# Patient Record
Sex: FEMALE | Race: WHITE | NOT HISPANIC OR LATINO | Employment: FULL TIME | URBAN - METROPOLITAN AREA
[De-identification: names, ages, dates, MRNs, and addresses within clinical notes are randomized per-mention and may not be internally consistent; named-entity substitution may affect disease eponyms.]

---

## 2023-10-23 ENCOUNTER — OFFICE VISIT (OUTPATIENT)
Dept: MEDICAL GROUP | Facility: MEDICAL CENTER | Age: 43
End: 2023-10-23
Payer: COMMERCIAL

## 2023-10-23 VITALS
TEMPERATURE: 98.2 F | HEART RATE: 60 BPM | SYSTOLIC BLOOD PRESSURE: 108 MMHG | HEIGHT: 70 IN | WEIGHT: 156.2 LBS | BODY MASS INDEX: 22.36 KG/M2 | DIASTOLIC BLOOD PRESSURE: 70 MMHG | OXYGEN SATURATION: 99 %

## 2023-10-23 DIAGNOSIS — F41.9 ANXIETY: ICD-10-CM

## 2023-10-23 DIAGNOSIS — F33.2 SEVERE EPISODE OF RECURRENT MAJOR DEPRESSIVE DISORDER, WITHOUT PSYCHOTIC FEATURES (HCC): ICD-10-CM

## 2023-10-23 DIAGNOSIS — D72.819 LEUKOPENIA, UNSPECIFIED TYPE: ICD-10-CM

## 2023-10-23 DIAGNOSIS — R76.8 ANA POSITIVE: ICD-10-CM

## 2023-10-23 DIAGNOSIS — R74.01 TRANSAMINITIS: ICD-10-CM

## 2023-10-23 PROBLEM — F33.9 RECURRENT MAJOR DEPRESSIVE DISORDER (HCC): Status: ACTIVE | Noted: 2023-10-23

## 2023-10-23 PROCEDURE — 3074F SYST BP LT 130 MM HG: CPT | Performed by: STUDENT IN AN ORGANIZED HEALTH CARE EDUCATION/TRAINING PROGRAM

## 2023-10-23 PROCEDURE — 99204 OFFICE O/P NEW MOD 45 MIN: CPT | Performed by: STUDENT IN AN ORGANIZED HEALTH CARE EDUCATION/TRAINING PROGRAM

## 2023-10-23 PROCEDURE — 3078F DIAST BP <80 MM HG: CPT | Performed by: STUDENT IN AN ORGANIZED HEALTH CARE EDUCATION/TRAINING PROGRAM

## 2023-10-23 RX ORDER — LORAZEPAM 1 MG/1
TABLET ORAL
COMMUNITY
Start: 2023-09-05

## 2023-10-23 RX ORDER — SERTRALINE HYDROCHLORIDE 100 MG/1
100 TABLET, FILM COATED ORAL DAILY
Qty: 90 TABLET | Refills: 3 | Status: SHIPPED | OUTPATIENT
Start: 2023-10-23 | End: 2024-01-29

## 2023-10-23 RX ORDER — HYDROXYZINE HYDROCHLORIDE 25 MG/1
TABLET, FILM COATED ORAL
COMMUNITY
Start: 2023-10-02 | End: 2024-03-12 | Stop reason: SDUPTHER

## 2023-10-23 ASSESSMENT — ENCOUNTER SYMPTOMS
DIZZINESS: 0
HEADACHES: 0
VOMITING: 0
WEIGHT LOSS: 0
PALPITATIONS: 0
CHILLS: 0
WHEEZING: 0
FEVER: 0
NAUSEA: 0
SHORTNESS OF BREATH: 0

## 2023-10-23 ASSESSMENT — PATIENT HEALTH QUESTIONNAIRE - PHQ9
CLINICAL INTERPRETATION OF PHQ2 SCORE: 5
SUM OF ALL RESPONSES TO PHQ QUESTIONS 1-9: 20
5. POOR APPETITE OR OVEREATING: 3 - NEARLY EVERY DAY

## 2023-10-23 ASSESSMENT — ANXIETY QUESTIONNAIRES
7. FEELING AFRAID AS IF SOMETHING AWFUL MIGHT HAPPEN: MORE THAN HALF THE DAYS
4. TROUBLE RELAXING: NEARLY EVERY DAY
2. NOT BEING ABLE TO STOP OR CONTROL WORRYING: NEARLY EVERY DAY
1. FEELING NERVOUS, ANXIOUS, OR ON EDGE: NEARLY EVERY DAY
GAD7 TOTAL SCORE: 19
5. BEING SO RESTLESS THAT IT IS HARD TO SIT STILL: MORE THAN HALF THE DAYS
6. BECOMING EASILY ANNOYED OR IRRITABLE: NEARLY EVERY DAY
3. WORRYING TOO MUCH ABOUT DIFFERENT THINGS: NEARLY EVERY DAY
IF YOU CHECKED OFF ANY PROBLEMS ON THIS QUESTIONNAIRE, HOW DIFFICULT HAVE THESE PROBLEMS MADE IT FOR YOU TO DO YOUR WORK, TAKE CARE OF THINGS AT HOME, OR GET ALONG WITH OTHER PEOPLE: SOMEWHAT DIFFICULT

## 2023-10-23 NOTE — LETTER
[facility]  Joseph Cuba M.D.  [address] [facility] [address]  [place] [address]  Fax: [phone]   Authorization for Release/Disclosure of   Protected Health Information   Name: DEION SANTOS : 1980 SSN: [id number]   Address: [address] [place] [address] Dr Varma  [place] Phone:    [phone] (home)    I authorize the entity listed below to release/disclose the PHI below to:   [facility]/Joseph Cuba M.D. and Joseph Cuba M.D.   Provider or Entity Name:  [place] [facility] - Radha Cortes MD    Address   City, [facility], [facility]   Phone:      Fax:     Reason for request: continuity of care   Information to be released:    [  ] LAST COLONOSCOPY,  including any PATH REPORT and follow-up  [  ] LAST FIT/COLOGUARD RESULT [  ] LAST DEXA  [  ] LAST MAMMOGRAM  [  ] LAST PAP  [  ] LAST LABS [  ] RETINA EXAM REPORT  [  ] IMMUNIZATION RECORDS  [  ] Release all info  Xxx last progress note      [  ] Check here and initial the line next to each item to release ALL health information INCLUDING  _____ Care and treatment for drug and / or alcohol abuse  _____ HIV testing, infection status, or AIDS  _____ Genetic Testing    DATES OF SERVICE OR TIME PERIOD TO BE DISCLOSED: _____________  I understand and acknowledge that:  * This Authorization may be revoked at any time by you in writing, except if your health information has already been used or disclosed.  * Your health information that will be used or disclosed as a result of you signing this authorization could be re-disclosed by the recipient. If this occurs, your re-disclosed health information may no longer be protected by State or Federal laws.  * You may refuse to sign this Authorization. Your refusal will not affect your ability to obtain treatment.  * This Authorization becomes effective upon signing and will  on (date) __________.      If no date is indicated, this Authorization will  one (1) year from the signature date.    Name:  Obdulia Dawson  Signature: Date:   10/23/2023     PLEASE FAX REQUESTED RECORDS BACK TO: [phone]

## 2023-10-23 NOTE — LETTER
[facility]  Joseph Cuba M.D.  [address] [facility] [address]  [place] [address]  Fax: [phone]   Authorization for Release/Disclosure of   Protected Health Information   Name: DEION SANTOS : 1980 SSN: [id number]   Address: [address] [place] [address]  [facility] [address] Phone:    [phone] (home)    I authorize the entity listed below to release/disclose the PHI below to:   [facility]/Joseph Cuba M.D. and Joseph Cuba M.D.   Provider or Entity Name:  Aliya Brigitte  [facility] [address] [facility] [address]     Address   City, State, [facility]   Phone:      Fax:     Reason for request: continuity of care   Information to be released:    [  ] LAST COLONOSCOPY,  including any PATH REPORT and follow-up  [  ] LAST FIT/COLOGUARD RESULT [  ] LAST DEXA  [  ] LAST MAMMOGRAM  [x  ] LAST PAP  [  ] LAST LABS [  ] RETINA EXAM REPORT  [ x ] IMMUNIZATION RECORDS  [  ] Release all info  X last progress note      [  ] Check here and initial the line next to each item to release ALL health information INCLUDING  _____ Care and treatment for drug and / or alcohol abuse  _____ HIV testing, infection status, or AIDS  _____ Genetic Testing    DATES OF SERVICE OR TIME PERIOD TO BE DISCLOSED: _____________  I understand and acknowledge that:  * This Authorization may be revoked at any time by you in writing, except if your health information has already been used or disclosed.  * Your health information that will be used or disclosed as a result of you signing this authorization could be re-disclosed by the recipient. If this occurs, your re-disclosed health information may no longer be protected by State or Federal laws.  * You may refuse to sign this Authorization. Your refusal will not affect your ability to obtain treatment.  * This Authorization becomes effective upon signing and will  on (date) __________.      If no date is indicated, this Authorization will  one (1) year from  the signature date.    Name: Obdulia Dawson  Signature: Date:   10/23/2023     PLEASE FAX REQUESTED RECORDS BACK TO: [phone]

## 2023-10-23 NOTE — PROGRESS NOTES
Subjective:     CC:  Diagnoses of Anxiety, Mild episode of recurrent major depressive disorder (HCC), DANYA positive, Leukopenia, unspecified type, and Transaminitis were pertinent to this visit.    HISTORY OF THE PRESENT ILLNESS: Patient is a 43 y.o. female. This pleasant patient is here today to establish care and discuss     OSH  lab reviewed as per HPI          Problem   Anxiety    History of anxiety depression previously well controlled on Lexapro unfortunately patient self titrated off Lexapro.  She was restarted on Lexapro in [place] however medication appears to be ineffective.  She reports she underwent some genetic testing and was started on sertraline 50 mg daily around 8/15/2023 based on results.  Currently she remains depressed and anxious.  Will increase sertraline to 100 mg.  Patient report has follow-up with psychiatry in 1 month     Recurrent Major Depressive Disorder (Hcc)   Danya Positive   Leukopenia    History of mild leukopenia with low ANC intermittent from [place] 2022.  Was referred to rheumatology and hematology.  -Work-up by primary in [place] noted positive DANYA patient was referred to rheumatology underwent work-up for urinalysis without proteinuria or hematuria, DANYA titer 1-40 double-stranded DNA antibody less than 1, negative RF, negative CCP negative Sjogren SCL 70 Norma 1 negative centromere antibody negative ribosomal P antibody negative smooth Mastel antibody screening negative mitochondria antibody screening    Further cancer care specialist Radha  Lab work-up included CBC with differential 8/4/2023 normal WBC count normal ANC, flow cytometry no monoclonal B antibody aberrant T cells or increase in blasts no evidence of non-Hodgkin lymphoproliferative disorder or acute leukemia ESR was normal normal renal function       Transaminitis    Mild   ribosomal P antibody negative smooth Mastel antibody screening negative mitochondria antibody screening         Health Maintenance:     ROS:  "  Review of Systems   Constitutional:  Negative for chills, fever and weight loss.   HENT:  Negative for hearing loss.    Respiratory:  Negative for shortness of breath and wheezing.    Cardiovascular:  Negative for chest pain and palpitations.   Gastrointestinal:  Negative for nausea and vomiting.   Genitourinary:  Negative for frequency and urgency.   Skin:  Negative for rash.   Neurological:  Negative for dizziness and headaches.         Objective:       Exam: /70 (BP Location: Left arm, Patient Position: Sitting)   Pulse 60   Temp 36.8 °C (98.2 °F) (Temporal)   Ht 1.778 m (5' 10\")   Wt 70.9 kg (156 lb 3.2 oz)   SpO2 99%  Body mass index is 22.41 kg/m².    Physical Exam  Constitutional:       Appearance: Normal appearance.   Cardiovascular:      Rate and Rhythm: Normal rate and regular rhythm.   Pulmonary:      Effort: Pulmonary effort is normal.      Breath sounds: Normal breath sounds.   Musculoskeletal:      Cervical back: Normal range of motion and neck supple.   Lymphadenopathy:      Cervical: No cervical adenopathy.   Neurological:      Mental Status: She is alert.           Labs:     Assessment & Plan:   43 y.o. female with the following -    1. Anxiety  2. Severe episode of recurrent major depressive disorder, without psychotic features (HCC)  Chronic, stable  Anxiety depression poorly controlled however denies SI HI  Has follow-up with psychiatry in 1 month  Report currently taking sertraline 50 mg daily with out significant adverse effect.  Will increase sertraline to 100 mg further management per psychiatry patient understands she can stop medication if she experience any acute change in her mood  - sertraline (ZOLOFT) 50 MG Tab; Take 2 Tablets by mouth every day.  Dispense: 90 Tablet; Refill: 3      3. ELIECER positive  Mild 1-40  Denies dry eyes dry mouth joint pain  Noted in previous lab  Incidental and work-up for leukopenia  Lab reviewed as per HPI  - Comp Metabolic Panel; Future    4. " Leukopenia, unspecified type  Chronic, stable, intermittent  We will request records from hematology and rheumatology  Labs reviewed as per HPI  Most recent lab 8/2023 shows normal WBC count and differentials  Plan  Requested records  Repeat labs  May consider referral if needed  - CBC WITH DIFFERENTIAL; Future  - Comp Metabolic Panel; Future    5. Transaminitis  Chronic, stable  Mild ALT elevation had autoimmune lab work done by rheumatology that was negative as per HPI  - Comp Metabolic Panel; Future      Return in about 3 months (around 1/23/2024).    Please note that this dictation was created using voice recognition software. I have made every reasonable attempt to correct obvious errors, but I expect that there are errors of grammar and possibly content that I did not discover before finalizing the note.

## 2023-10-23 NOTE — LETTER
[facility]  Joseph Cuba M.D.  [address] [facility] [[email or web address]] [address]  [place] [address]  Fax: [phone]   Authorization for Release/Disclosure of   Protected Health Information   Name: OBDULIA SANTOS : 1980 SSN: [id number]   Address: [address] [place] [address] [address]ma  [place] [address] Phone:    [phone] (home)    I authorize the entity listed below to release/disclose the PHI below to:   [facility]/Joseph Cuba M.D. and Joseph Cuba M.D.   Provider or Entity Name:  [place] [facility]   kellen Layton  [address] [place], [place]    Address   City, State, [facility]   Phone:      Fax:     Reason for request: continuity of care   Information to be released:    [  ] LAST COLONOSCOPY,  including any PATH REPORT and follow-up  [  ] LAST FIT/COLOGUARD RESULT [  ] LAST DEXA  [  ] LAST MAMMOGRAM  [  ] LAST PAP  [  ] LAST LABS [  ] RETINA EXAM REPORT  [  ] IMMUNIZATION RECORDS  [  ] Release all info      [  ] Check here and initial the line next to each item to release ALL health information INCLUDING  _____ Care and treatment for drug and / or alcohol abuse  _____ HIV testing, infection status, or AIDS  _____ Genetic Testing    DATES OF SERVICE OR TIME PERIOD TO BE DISCLOSED: _____________  I understand and acknowledge that:  * This Authorization may be revoked at any time by you in writing, except if your health information has already been used or disclosed.  * Your health information that will be used or disclosed as a result of you signing this authorization could be re-disclosed by the recipient. If this occurs, your re-disclosed health information may no longer be protected by State or Federal laws.  * You may refuse to sign this Authorization. Your refusal will not affect your ability to obtain treatment.  * This Authorization becomes effective upon signing and will  on (date) __________.      If no date is indicated, this Authorization will  one (1) year from the signature date.    Name: Obdulia  Samir  Signature: Date:   10/23/2023     PLEASE FAX REQUESTED RECORDS BACK TO: [phone]

## 2023-11-20 ENCOUNTER — TELEMEDICINE (OUTPATIENT)
Dept: BEHAVIORAL HEALTH | Facility: CLINIC | Age: 43
End: 2023-11-20
Payer: COMMERCIAL

## 2023-11-20 ENCOUNTER — PATIENT MESSAGE (OUTPATIENT)
Dept: BEHAVIORAL HEALTH | Facility: MEDICAL CENTER | Age: 43
End: 2023-11-20
Payer: COMMERCIAL

## 2023-11-20 DIAGNOSIS — F41.1 GAD (GENERALIZED ANXIETY DISORDER): ICD-10-CM

## 2023-11-20 DIAGNOSIS — F33.2 MDD (MAJOR DEPRESSIVE DISORDER), RECURRENT SEVERE, WITHOUT PSYCHOSIS (HCC): ICD-10-CM

## 2023-11-20 PROCEDURE — 90833 PSYTX W PT W E/M 30 MIN: CPT | Mod: GT | Performed by: PSYCHIATRY & NEUROLOGY

## 2023-11-20 PROCEDURE — 99204 OFFICE O/P NEW MOD 45 MIN: CPT | Mod: GT | Performed by: PSYCHIATRY & NEUROLOGY

## 2023-11-20 RX ORDER — DESVENLAFAXINE SUCCINATE 50 MG/1
50 TABLET, EXTENDED RELEASE ORAL EVERY EVENING
Qty: 90 TABLET | Refills: 1 | Status: SHIPPED | OUTPATIENT
Start: 2023-11-20 | End: 2023-12-11

## 2023-11-20 ASSESSMENT — ANXIETY QUESTIONNAIRES
5. BEING SO RESTLESS THAT IT IS HARD TO SIT STILL: SEVERAL DAYS
6. BECOMING EASILY ANNOYED OR IRRITABLE: NEARLY EVERY DAY
4. TROUBLE RELAXING: NEARLY EVERY DAY
7. FEELING AFRAID AS IF SOMETHING AWFUL MIGHT HAPPEN: SEVERAL DAYS
2. NOT BEING ABLE TO STOP OR CONTROL WORRYING: NEARLY EVERY DAY
IF YOU CHECKED OFF ANY PROBLEMS ON THIS QUESTIONNAIRE, HOW DIFFICULT HAVE THESE PROBLEMS MADE IT FOR YOU TO DO YOUR WORK, TAKE CARE OF THINGS AT HOME, OR GET ALONG WITH OTHER PEOPLE: SOMEWHAT DIFFICULT
1. FEELING NERVOUS, ANXIOUS, OR ON EDGE: NEARLY EVERY DAY
GAD7 TOTAL SCORE: 17
3. WORRYING TOO MUCH ABOUT DIFFERENT THINGS: NEARLY EVERY DAY

## 2023-11-20 ASSESSMENT — PATIENT HEALTH QUESTIONNAIRE - PHQ9
CLINICAL INTERPRETATION OF PHQ2 SCORE: 6
SUM OF ALL RESPONSES TO PHQ QUESTIONS 1-9: 18
5. POOR APPETITE OR OVEREATING: 1 - SEVERAL DAYS

## 2023-11-20 NOTE — PROGRESS NOTES
"[facility]  INITIAL PSYCHIATRY EVALUATION    This evaluation was conducted via Zoom, using secure and encrypted videoconferencing technology. The patient was physically located at their home address in [place], [place], and the physician was located at her home office in [place], [place]. The patient was presented by self. The patient’s identity was confirmed and verbal consent for the telemedicine encounter was obtained.      CC:  Initial Evaluation and Medication Management of Mental Health Symptoms      History Of Present Illness:  Obdulia Dawson is a 43 y.o. 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     The patient reported the following:  She has been struggling with depression and anxiety her most of her life, strong family hx of both, has been on SSRIs since 2006, most recently Lexapro 20 mg and discontinued it in April to see what her baseline was, and did okay for a little while and then started feeling depressed and anxious and noticing OCD thoughts behaviors.  With her depression and anxiety, it has always been present even with medications but now it is too intense and she feels like her feet are in concrete and she is always having to say to herself \"take it 1 step at a time\" and \"you are fine.\"  She needed Ativan for her anxiety and panic attacks until she began responding to the Zoloft, increased to 100 mg about 3 weeks ago and she is not noticing any significant improvement.  She takes Hydroxyzine 50 mg at times for her insomnia, doesn't want to have to rely on something and so tries to not take it daily. 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  Her  is retired from law enforcement.  She prefers being busy all day.  She has very good sleep hygiene and uses the calm ozzy and works to eat healthy and also exercise.    ROS: As noted above in HPI.        Past Psychiatric History:  Denies any hospitalizations  Denies any history of SI, SA or self harm  Medication trials:  all SSRIs, has [employer] testing      Family Psychiatric History:  Depression and Anxiety    Substance Use/Addiction History:  Alcohol:  hx of alcohol use DO, in remission x 4 years with participation in peer support/recovery  Cannabis:  denies  Tobacco:  used to smoke tobacco and then switched to vaping and now uses 3 mg nicotine pods  Caffeine:  makes coffee in AM 1/2 decaf and often has a Coca Cola or coffee in PM but doesn't notice it impacting her sleep, sleeps well once she falls asleep  Other:  Denies any other substances    Social History:  From [place], , one daughter 22 years old, estranged relationship and they have reconnected, her daughter has been living with her ex- since she was 15 years old.  Denies any hx of abuse/trauma.    Allergies:  Patient has no known allergies.      Physical Examination and Mental Status Exam:  Vital signs: There were no vitals taken for this visit.    CONSTITUTIONAL:  General Appearance:  Clean, casual attire, good eye contact, engaged with provider    ORIENTATION:  Oriented to time, place and person  RECENT AND REMOTE MEMORY:  Grossly intact  ATTENTION SPAN AND CONCENTRATION:  within normal range  LANGUAGE:  no deficits appreciated  FUND OF KNOWLEDGE:  has awareness of current events, past history and normal vocabulary  SPEECH:  normal volume, amount, rate and articulation, no perseveration or paucity of language  MOOD:  Depressed and anxious    AFFECT:  Congruent with mood  THOUGHT PROCESS:  logical and goal directed  THOUGHT CONTENT:  Denies any SI/HI or AVH, no delusional thinking nor preoccupations appreciated  ASSOCIATIONS:  Intact, not loose, no tangentiality or circumstantiality  MEMORY:  No gross evidence of memory deficits  JUDGMENT:  adequate concerning everyday activities  INSIGHT:  adequate to psychiatric condition    DIAGNOSTIC IMPRESSION:  1. MDD (major depressive disorder), recurrent severe, without psychosis ([facility])  - desvenlafaxine succinate (PRISTIQ) 50 MG TABLET SR 24 HR; Take 1 Tablet by mouth every evening.  Dispense: 90 Tablet; Refill: 1  - Patient has been identified as having a positive depression screening. Appropriate orders and counseling have been given.    2. NICKIE (generalized anxiety disorder)  - desvenlafaxine succinate (PRISTIQ) 50 MG TABLET SR 24 HR; Take 1 Tablet by mouth every evening.  Dispense: 90 Tablet; Refill: 1       Assessment and Plan:  The patient's risk of suicide is assessed as low.  1.  MDD, recurrent, severe, without psychotic features, improving  NICKIE, improving  Insomnia, problems falling asleep, not well controlled  Nicotine dependence, former smoker, now using nicotine pods 3 mg  ETOH use DO, in remission  Continue Zoloft 100 mg for now, slow metabolizer per Genesight  Begin Pristiq 50 mg (normal metabolizer per [employer]) and if do well, then okay to reduce Zoloft to 50 mg before next appointment  Continue Hydroxyzine 25 mg 1 to 2 QHS PRN insomnia  Begin daily intentional breathing practice - mychart msg sent  Continue good sleep hygiene - uses CALM and other behavioral strategies to prepare for bed  Continue in peer recovery for continued remission from alcohol  Educ about caffeine's impact on anxiety and insomnia  Reviewed her moka5 testing which was sent via email 11/20/23 - will plan to save copy to her medical record, by Sansan - Several genetic variations that impact her response to  medications  Reviewed her medical record prior to the start of her appt,     2.  The patient has a safety plan which included the 988 crisis text and phone line and going to the nearest ED if symptoms worsen.    3.  Risks, benefits, alternatives and side effects were discussed for all medicines prescribed at this visit.  The patient voiced understanding providing informed consent.  The patient agrees to call the clinic with any questions or concerns, or seek emergent medical care if warranted.    4.  Follow up in 4 weeks or call sooner PRN    The proposed treatment plan was discussed with the patient who was provided the opportunity to ask questions and make suggestions regarding alternative treatment. Patient verbalized understanding and expressed agreement with the plan.     Greater than 16 minutes of the visit was spent in psychotherapy.  Psychotherapy include:  Provided the patient with supportive psychotherapy to build rapport and establish a therapeutic alliance with the patient, psychoeducation, topics: history of her struggle with depression and anxiety, healthy coping strategies, peer recovery, genesight testing results and meaning of the results, breathing exercises for anxiety.    Delia Armstrong M.D.      This note was created using voice recognition software (Dragon). The accuracy of the dictation is limited by the abilities of the software. I have reviewed the note prior to signing, however some errors in grammar and context are still possible. If you have any questions related to this note please do not hesitate to contact our office.

## 2023-12-11 RX ORDER — DESVENLAFAXINE 25 MG/1
25 TABLET, EXTENDED RELEASE ORAL
Qty: 90 TABLET | Refills: 1 | Status: SHIPPED | OUTPATIENT
Start: 2023-12-11 | End: 2024-01-29

## 2023-12-17 ENCOUNTER — OFFICE VISIT (OUTPATIENT)
Dept: URGENT CARE | Facility: PHYSICIAN GROUP | Age: 43
End: 2023-12-17
Payer: COMMERCIAL

## 2023-12-17 ENCOUNTER — APPOINTMENT (OUTPATIENT)
Dept: URGENT CARE | Facility: PHYSICIAN GROUP | Age: 43
End: 2023-12-17
Payer: COMMERCIAL

## 2023-12-17 VITALS
HEART RATE: 91 BPM | SYSTOLIC BLOOD PRESSURE: 119 MMHG | BODY MASS INDEX: 22.66 KG/M2 | WEIGHT: 153 LBS | HEIGHT: 69 IN | TEMPERATURE: 98 F | OXYGEN SATURATION: 100 % | RESPIRATION RATE: 20 BRPM | DIASTOLIC BLOOD PRESSURE: 79 MMHG

## 2023-12-17 DIAGNOSIS — B33.8 RSV INFECTION: ICD-10-CM

## 2023-12-17 DIAGNOSIS — J02.9 PHARYNGITIS, UNSPECIFIED ETIOLOGY: ICD-10-CM

## 2023-12-17 LAB
FLUAV RNA SPEC QL NAA+PROBE: NEGATIVE
FLUBV RNA SPEC QL NAA+PROBE: NEGATIVE
RSV RNA SPEC QL NAA+PROBE: POSITIVE
S PYO DNA SPEC NAA+PROBE: NOT DETECTED
SARS-COV-2 RNA RESP QL NAA+PROBE: NEGATIVE

## 2023-12-17 PROCEDURE — 0241U POCT CEPHEID COV-2, FLU A/B, RSV - PCR: CPT | Performed by: PHYSICIAN ASSISTANT

## 2023-12-17 PROCEDURE — 99213 OFFICE O/P EST LOW 20 MIN: CPT | Performed by: PHYSICIAN ASSISTANT

## 2023-12-17 PROCEDURE — 3078F DIAST BP <80 MM HG: CPT | Performed by: PHYSICIAN ASSISTANT

## 2023-12-17 PROCEDURE — 87651 STREP A DNA AMP PROBE: CPT | Performed by: PHYSICIAN ASSISTANT

## 2023-12-17 PROCEDURE — 3074F SYST BP LT 130 MM HG: CPT | Performed by: PHYSICIAN ASSISTANT

## 2023-12-17 NOTE — PROGRESS NOTES
"Subjective:   Obdulia Dawson is a 43 y.o. female who presents for Pharyngitis (2 days )      HPI  The patient presents to the Urgent Care with complaints of a sore throat onset 2 days ago.  Hurts to swallow but handling oral secretions and tolerating fluids well.  She also reports of ear pain.  Associated chest congestion and she started coughing a little this morning with phlegm.  She is here to rule out strep.  Denies any known fever, chest pain, SOB, wheezing, vomiting, diarrhea. Positive sick contacts.       History reviewed. No pertinent past medical history.  No Known Allergies     Objective:     /79   Pulse 91   Temp 36.7 °C (98 °F) (Temporal)   Resp 20   Ht 1.753 m (5' 9\")   Wt 69.4 kg (153 lb)   SpO2 100%   BMI 22.59 kg/m²     Physical Exam  Vitals reviewed.   Constitutional:       General: She is not in acute distress.     Appearance: Normal appearance. She is not ill-appearing or toxic-appearing.   HENT:      Right Ear: Tympanic membrane normal.      Left Ear: Tympanic membrane normal.      Mouth/Throat:      Mouth: Mucous membranes are moist.      Pharynx: Uvula midline. Posterior oropharyngeal erythema present. No pharyngeal swelling, oropharyngeal exudate or uvula swelling.      Tonsils: No tonsillar exudate or tonsillar abscesses.   Eyes:      Conjunctiva/sclera: Conjunctivae normal.   Cardiovascular:      Rate and Rhythm: Normal rate and regular rhythm.      Heart sounds: Normal heart sounds.   Pulmonary:      Effort: Pulmonary effort is normal. No respiratory distress.      Breath sounds: Normal breath sounds. No wheezing, rhonchi or rales.   Musculoskeletal:      Cervical back: Neck supple. No rigidity.   Lymphadenopathy:      Cervical: No cervical adenopathy.   Skin:     General: Skin is warm and dry.   Neurological:      General: No focal deficit present.      Mental Status: She is alert and oriented to person, place, and time.   Psychiatric:         Mood and Affect: Mood normal.         " Behavior: Behavior normal.       Results for orders placed or performed in visit on 12/17/23   POCT CEPHEID GROUP A STREP - PCR   Result Value Ref Range    POC Group A Strep, PCR Not Detected Not Detected, Invalid   POCT CoV-2, Flu A/B, RSV by PCR   Result Value Ref Range    SARS-CoV-2 by PCR Negative Negative, Invalid    Influenza virus A RNA Negative Negative, Invalid    Influenza virus B, PCR Negative Negative, Invalid    RSV, PCR Positive (A) Negative, Invalid     Diagnosis and associated orders:     1. RSV infection  - POCT CoV-2, Flu A/B, RSV by PCR    2. Pharyngitis, unspecified etiology  - POCT CEPHEID GROUP A STREP - PCR       Comments/MDM:     Viral etiology. They have a normal pulse oximetry on room air, afebrile, and a normal pulmonary exam. Overall, the patient is very well appearing. I do not feel that this patient would benefit from antibiotics at this time.   Recommended symptomatic and supportive care at this time that includes plenty of fluids, rest, Tylenol/Ibuprofen for pain/fever, warm salt water gargles for sore throat, OTC cough and decongestant medication, Flonase, nasal saline washes. If no improvement in 5-7 days or any worsening symptoms, recommend returning to the urgent care for re-evaluation.        I personally reviewed prior external notes and test results pertinent to today's visit. Pathogenesis of diagnosis discussed including typical length and natural progression. Supportive care, natural history, differential diagnoses, and indications for immediate follow-up discussed. Patient expresses understanding and agrees to plan. Patient denies any other questions or concerns.     Follow-up with the primary care physician for recheck, reevaluation, and consideration of further management.    Please note that this dictation was created using voice recognition software. I have made a reasonable attempt to correct obvious errors, but I expect that there are errors of grammar and possibly  content that I did not discover before finalizing the note.    This note was electronically signed by Hamilton Herman PA-C

## 2023-12-19 ENCOUNTER — TELEMEDICINE (OUTPATIENT)
Dept: BEHAVIORAL HEALTH | Facility: CLINIC | Age: 43
End: 2023-12-19
Payer: COMMERCIAL

## 2023-12-19 DIAGNOSIS — F41.1 GAD (GENERALIZED ANXIETY DISORDER): ICD-10-CM

## 2023-12-19 DIAGNOSIS — F33.41 RECURRENT MAJOR DEPRESSIVE DISORDER, IN PARTIAL REMISSION (HCC): ICD-10-CM

## 2023-12-19 PROCEDURE — 99214 OFFICE O/P EST MOD 30 MIN: CPT | Mod: GT | Performed by: PSYCHIATRY & NEUROLOGY

## 2023-12-19 NOTE — PROGRESS NOTES
"[facility]  PSYCHIATRIC FOLLOW-UP NOTE    This evaluation was conducted via Zoom, using secure and encrypted videoconferencing technology. The patient was physically located at their home address in [place], [place], and the physician was located at her home office in [place], [place]. The patient was presented by self. The patient’s identity was confirmed and verbal consent for the telemedicine encounter was obtained.    CC:  Presents for follow up visit for medication evaluation and management        History Of Present Illness:  Obdulia Dawson is a 43 y.o. 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     The patient reported the following:  Her pharmacist warned her about the risk of serotonin syndrome and the symptoms of it sounded like anxiety and so instead of starting the Pristiq at 50 mg with Zoloft staying at 100 mg, she reduced the Zoloft down to 50 mg x 1 week and then started the Pristiq 50 mg.  It made her feel activated and more anxious or \"a little neurotic\".  For example she went to visit family and had to go to her room to calm down.  She is feeling physically sick right now -cold and sore throat.  She contacted this MD on 11 December and agreed to try 25 mg of the Pristiq, and this dose combined with the Zoloft 50 mg seems to be better.    ROS: As noted above in HPI.      History 11/20/23 visit: She has been struggling with depression and anxiety her most of her life, strong family hx of both, has been on SSRIs since 2006, most recently Lexapro 20 mg and discontinued it in April to see what her baseline was, and did okay for a little while and then started feeling depressed and anxious and noticing OCD thoughts behaviors.  With her depression and anxiety, it has always been present even with medications but now it is too intense and she feels like her feet " "are in concrete and she is always having to say to herself \"take it 1 step at a time\" and \"you are fine.\"  She needed Ativan for her anxiety and panic attacks until she began responding to the Zoloft, increased to 100 mg about 3 weeks ago and she is not noticing any significant improvement.  She takes Hydroxyzine 50 mg at times for her insomnia, doesn't want to have to rely on something and so tries to not take it daily. 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  Her  is retired from law enforcement.  She prefers being busy all day.  She has very good sleep hygiene and uses the calm ozzy and works to eat healthy and also exercise.        Past Psychiatric History:  Denies any hospitalizations  Denies any history of SI, SA or self harm  Medication trials:  all SSRIs, has [employer] testing      Family Psychiatric History:  Depression and Anxiety    Substance Use/Addiction History:  Alcohol:  hx of alcohol use DO, in remission x 4 years with participation in peer support/recovery  Cannabis:  denies  Tobacco:  used to smoke tobacco and then switched to vaping and now uses 3 mg nicotine pods  Caffeine:  makes coffee in AM 1/2 decaf and often has a Coca Cola or coffee in PM but doesn't notice it impacting her sleep, sleeps well once she falls asleep  Other:  Denies any other substances    Social History:  From [place], , one daughter 22 years old, estranged relationship and they have reconnected, her daughter has been living with her ex- since she was 15 years old.  Denies any hx of " abuse/trauma.    Allergies:  Patient has no known allergies.      Physical Examination and Mental Status Exam:  Vital signs: There were no vitals taken for this visit.    CONSTITUTIONAL:  General Appearance:  Clean, casual attire, good eye contact, engaged with provider    ORIENTATION:  Oriented to time, place and person  RECENT AND REMOTE MEMORY:  Grossly intact  ATTENTION SPAN AND CONCENTRATION:  within normal range  LANGUAGE:  no deficits appreciated  FUND OF KNOWLEDGE:  has awareness of current events, past history and normal vocabulary  SPEECH:  normal volume, amount, rate and articulation, no perseveration or paucity of language  MOOD:  Neutral   AFFECT:  Congruent with mood  THOUGHT PROCESS:  logical and goal directed  THOUGHT CONTENT:  Denies any SI/HI or AVH, no delusional thinking nor preoccupations appreciated  ASSOCIATIONS:  Intact, not loose, no tangentiality or circumstantiality  MEMORY:  No gross evidence of memory deficits  JUDGMENT:  adequate concerning everyday activities  INSIGHT:  adequate to psychiatric condition    DIAGNOSTIC IMPRESSION:  1. Recurrent major depressive disorder, in partial remission (HCC)    2. NICKIE (generalized anxiety disorder)         Assessment and Plan:  The patient's risk of suicide is assessed as low.  1.  MDD, recurrent, severe, without psychotic features, improving  NICKIE, improving  Insomnia, problems falling asleep  Nicotine dependence, former smoker, now using nicotine pods 3 mg  ETOH use DO, in remission x 4 years  Continue self reduced dose of Zoloft  to 50 mg from 100 mg for now, slow metabolizer per [employer]  Continue reduced dose of Pristiq 25 mg, from Pristiq 50 mg - on 50 mg x approx 1 week combined with zoloft 50 mg, felt more activated and off (normal metabolizer per [employer]) and if do well, then okay to reduce Zoloft to 50 mg before next appointment  Continue Hydroxyzine 25 mg 1 to 2 QHS PRN insomnia  F/U Begin daily intentional breathing practice - mychart  msg sent  Continue good sleep hygiene - uses CALM and other behavioral strategies to prepare for bed  Continue in peer recovery for continued remission from alcohol  Educ about caffeine's impact on anxiety and insomnia  Reviewed her Network testing which was sent via email 11/20/23 - will plan to save copy to her medical record, by [employer] - Several genetic variations that impact her response to medications  Reviewed prior visit HPI, histories and treatment plan in preparation for today's visit      2.  The patient has a safety plan which included the [employer] text and phone line and going to the nearest ED if symptoms worsen.    3.  Risks, benefits, alternatives and side effects were discussed for all medicines prescribed at this visit.  The patient voiced understanding providing informed consent.  The patient agrees to call the clinic with any questions or concerns, or seek emergent medical care if warranted.    4.  Follow up in 4 weeks or call sooner PRN    The proposed treatment plan was discussed with the patient who was provided the opportunity to ask questions and make suggestions regarding alternative treatment. Patient verbalized understanding and expressed agreement with the plan.       Delia Armstrong M.D.      This note was created using voice recognition software (Dragon). The accuracy of the dictation is limited by the abilities of the software. I have reviewed the note prior to signing, however some errors in grammar and context are still possible. If you have any questions related to this note please do not hesitate to contact our office.

## 2024-01-08 ENCOUNTER — APPOINTMENT (OUTPATIENT)
Dept: LAB | Facility: MEDICAL CENTER | Age: 44
End: 2024-01-08
Payer: COMMERCIAL

## 2024-01-23 ENCOUNTER — HOSPITAL ENCOUNTER (OUTPATIENT)
Dept: LAB | Facility: MEDICAL CENTER | Age: 44
End: 2024-01-23
Attending: STUDENT IN AN ORGANIZED HEALTH CARE EDUCATION/TRAINING PROGRAM
Payer: COMMERCIAL

## 2024-01-23 DIAGNOSIS — D72.819 LEUKOPENIA, UNSPECIFIED TYPE: ICD-10-CM

## 2024-01-23 DIAGNOSIS — R74.01 TRANSAMINITIS: ICD-10-CM

## 2024-01-23 DIAGNOSIS — R76.8 ANA POSITIVE: ICD-10-CM

## 2024-01-23 LAB
ALBUMIN SERPL BCP-MCNC: 4.3 G/DL (ref 3.2–4.9)
ALBUMIN/GLOB SERPL: 1.7 G/DL
ALP SERPL-CCNC: 50 U/L (ref 30–99)
ALT SERPL-CCNC: 61 U/L (ref 2–50)
ANION GAP SERPL CALC-SCNC: 9 MMOL/L (ref 7–16)
AST SERPL-CCNC: 34 U/L (ref 12–45)
BASOPHILS # BLD AUTO: 0.9 % (ref 0–1.8)
BASOPHILS # BLD: 0.03 K/UL (ref 0–0.12)
BILIRUB SERPL-MCNC: 0.4 MG/DL (ref 0.1–1.5)
BUN SERPL-MCNC: 18 MG/DL (ref 8–22)
CALCIUM ALBUM COR SERPL-MCNC: 8.6 MG/DL (ref 8.5–10.5)
CALCIUM SERPL-MCNC: 8.8 MG/DL (ref 8.5–10.5)
CHLORIDE SERPL-SCNC: 104 MMOL/L (ref 96–112)
CO2 SERPL-SCNC: 24 MMOL/L (ref 20–33)
CREAT SERPL-MCNC: 0.55 MG/DL (ref 0.5–1.4)
EOSINOPHIL # BLD AUTO: 0.1 K/UL (ref 0–0.51)
EOSINOPHIL NFR BLD: 2.9 % (ref 0–6.9)
ERYTHROCYTE [DISTWIDTH] IN BLOOD BY AUTOMATED COUNT: 41.3 FL (ref 35.9–50)
GFR SERPLBLD CREATININE-BSD FMLA CKD-EPI: 117 ML/MIN/1.73 M 2
GLOBULIN SER CALC-MCNC: 2.5 G/DL (ref 1.9–3.5)
GLUCOSE SERPL-MCNC: 82 MG/DL (ref 65–99)
HCT VFR BLD AUTO: 38.5 % (ref 37–47)
HGB BLD-MCNC: 13.1 G/DL (ref 12–16)
IMM GRANULOCYTES # BLD AUTO: 0 K/UL (ref 0–0.11)
IMM GRANULOCYTES NFR BLD AUTO: 0 % (ref 0–0.9)
LYMPHOCYTES # BLD AUTO: 1.41 K/UL (ref 1–4.8)
LYMPHOCYTES NFR BLD: 40.8 % (ref 22–41)
MCH RBC QN AUTO: 30.5 PG (ref 27–33)
MCHC RBC AUTO-ENTMCNC: 34 G/DL (ref 32.2–35.5)
MCV RBC AUTO: 89.5 FL (ref 81.4–97.8)
MONOCYTES # BLD AUTO: 0.37 K/UL (ref 0–0.85)
MONOCYTES NFR BLD AUTO: 10.7 % (ref 0–13.4)
NEUTROPHILS # BLD AUTO: 1.55 K/UL (ref 1.82–7.42)
NEUTROPHILS NFR BLD: 44.7 % (ref 44–72)
NRBC # BLD AUTO: 0 K/UL
NRBC BLD-RTO: 0 /100 WBC (ref 0–0.2)
PLATELET # BLD AUTO: 296 K/UL (ref 164–446)
PMV BLD AUTO: 10.1 FL (ref 9–12.9)
POTASSIUM SERPL-SCNC: 4.7 MMOL/L (ref 3.6–5.5)
PROT SERPL-MCNC: 6.8 G/DL (ref 6–8.2)
RBC # BLD AUTO: 4.3 M/UL (ref 4.2–5.4)
SODIUM SERPL-SCNC: 137 MMOL/L (ref 135–145)
WBC # BLD AUTO: 3.5 K/UL (ref 4.8–10.8)

## 2024-01-23 PROCEDURE — 80053 COMPREHEN METABOLIC PANEL: CPT

## 2024-01-23 PROCEDURE — 36415 COLL VENOUS BLD VENIPUNCTURE: CPT

## 2024-01-23 PROCEDURE — 85025 COMPLETE CBC W/AUTO DIFF WBC: CPT

## 2024-01-29 ENCOUNTER — TELEMEDICINE (OUTPATIENT)
Dept: BEHAVIORAL HEALTH | Facility: CLINIC | Age: 44
End: 2024-01-29
Payer: COMMERCIAL

## 2024-01-29 DIAGNOSIS — F41.1 GAD (GENERALIZED ANXIETY DISORDER): ICD-10-CM

## 2024-01-29 DIAGNOSIS — F33.41 RECURRENT MAJOR DEPRESSIVE DISORDER, IN PARTIAL REMISSION (HCC): ICD-10-CM

## 2024-01-29 PROCEDURE — 90833 PSYTX W PT W E/M 30 MIN: CPT | Mod: 95 | Performed by: PSYCHIATRY & NEUROLOGY

## 2024-01-29 PROCEDURE — 99214 OFFICE O/P EST MOD 30 MIN: CPT | Mod: 95 | Performed by: PSYCHIATRY & NEUROLOGY

## 2024-01-29 RX ORDER — DESVENLAFAXINE SUCCINATE 50 MG/1
50 TABLET, EXTENDED RELEASE ORAL DAILY
Qty: 90 TABLET | Refills: 1 | Status: SHIPPED | OUTPATIENT
Start: 2024-01-29

## 2024-01-29 NOTE — PROGRESS NOTES
"[facility]  PSYCHIATRIC FOLLOW-UP NOTE    This evaluation was conducted via Zoom, using secure and encrypted videoconferencing technology. The patient was physically located at their home address in [place], [place], and the physician was located at her home office in [place], [place]. The patient was presented by self. The patient’s identity was confirmed and verbal consent for the telemedicine encounter was obtained.    CC:  Presents for follow up visit for medication evaluation and management        History Of Present Illness:  Obdulia Dawson is a 43 y.o. 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     The patient reported the following:  She had what sounds like a panic attack 1.5 weeks ago, had not had one in a very long time, since September 2023 and had to take an Ativan and does not like to do so given her hx of ETOH Use, has started a new job as a , new salon, products, clients, coworkers, and so this is contributing some, but recalls that when she was on Lexapro that she was able to not feel relaxed if she was not busy, now she can't relax if she is not busy all the time b/c of he thoughts and her OCD, which started approx age 17, is worse.  She is taking Pristiq 25 mg and Zoloft 50 mg, reduced prior to last visit from 100 mg, is a slow metabolizer for Zoloft.    Last visit: Her pharmacist warned her about the risk of serotonin syndrome and the symptoms of it sounded like anxiety and so instead of starting the Pristiq at 50 mg with Zoloft staying at 100 mg, she reduced the Zoloft down to 50 mg x 1 week and then started the Pristiq 50 mg.  It made her feel activated and more anxious or \"a little neurotic\".  For example she went to visit family and had to go to her room to calm down.  She is feeling physically sick right now -cold and sore throat.  She " "contacted this MD on 11 December and agreed to try 25 mg of the Pristiq, and this dose combined with the Zoloft 50 mg seems to be better.    ROS: As noted above in HPI.      History 11/20/23 visit: She has been struggling with depression and anxiety her most of her life, strong family hx of both, has been on SSRIs since 2006, most recently Lexapro 20 mg and discontinued it in April to see what her baseline was, and did okay for a little while and then started feeling depressed and anxious and noticing OCD thoughts behaviors.  With her depression and anxiety, it has always been present even with medications but now it is too intense and she feels like her feet are in concrete and she is always having to say to herself \"take it 1 step at a time\" and \"you are fine.\"  She needed Ativan for her anxiety and panic attacks until she began responding to the Zoloft, increased to 100 mg about 3 weeks ago and she is not noticing any significant improvement.  She takes Hydroxyzine 50 mg at times for her insomnia, doesn't want to have to rely on something and so tries to not take it daily. She is going through a lot of changes now, she and her  have been doing [employer] x2 years and recently moved to [place], and she has established with a therapist, Lainey Alexandra, is going to AA meetings and is volunteering and is considering working again, previously worked as a [employer], has checked out a couple of salons and is getting really excited about this.  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  Her  is retired from law enforcement.  She prefers being busy all day.  She has very good sleep hygiene and uses the calm ozzy and works to eat healthy and also exercise.        Past Psychiatric History:  Denies any hospitalizations  Denies any history of SI, SA or self harm  Medication trials:  all SSRIs, has [employer] testing      Family Psychiatric History:  Depression and Anxiety    Substance " Use/Addiction History:  Alcohol:  hx of alcohol use DO, in remission x 4 years with participation in peer support/recovery  Cannabis:  denies  Tobacco:  used to smoke tobacco and then switched to vaping and now uses 3 mg nicotine pods  Caffeine:  makes coffee in AM 1/2 decaf and often has a Coca Cola or coffee in PM but doesn't notice it impacting her sleep, sleeps well once she falls asleep  Other:  Denies any other substances    Social History:  From [place], , one daughter 22 years old, estranged relationship and they have reconnected, her daughter has been living with her ex- since she was 15 years old.  Denies any hx of abuse/trauma.    Allergies:  Patient has no known allergies.      Physical Examination and Mental Status Exam:  Vital signs: There were no vitals taken for this visit.    CONSTITUTIONAL:  General Appearance:  Clean, casual attire, good eye contact, engaged with provider    ORIENTATION:  Oriented to time, place and person  RECENT AND REMOTE MEMORY:  Grossly intact  ATTENTION SPAN AND CONCENTRATION:  within normal range  LANGUAGE:  no deficits appreciated  FUND OF KNOWLEDGE:  has awareness of current events, past history and normal vocabulary  SPEECH:  normal volume, amount, rate and articulation, no perseveration or paucity of language  MOOD:  Anxious   AFFECT:  Mood congruent  THOUGHT PROCESS:  logical and goal directed  THOUGHT CONTENT:  Denies any SI/HI or AVH, no delusional thinking nor preoccupations appreciated  ASSOCIATIONS:  Intact, not loose, no tangentiality or circumstantiality  MEMORY:  No gross evidence of memory deficits  JUDGMENT:  adequate concerning everyday activities  INSIGHT:  adequate to psychiatric condition    DIAGNOSTIC IMPRESSION:  1. Recurrent major depressive disorder, in partial remission (HCC)  - sertraline (ZOLOFT) 50 MG Tab; Take 1 Tablet by mouth every day.  Dispense: 30 Tablet; Refill: 11  - desvenlafaxine succinate (PRISTIQ) 50 MG TABLET SR 24 HR; Take 1  Tablet by mouth every day.  Dispense: 90 Tablet; Refill: 1    2. NICKIE (generalized anxiety disorder)  - sertraline (ZOLOFT) 50 MG Tab; Take 1 Tablet by mouth every day.  Dispense: 30 Tablet; Refill: 11  - desvenlafaxine succinate (PRISTIQ) 50 MG TABLET SR 24 HR; Take 1 Tablet by mouth every day.  Dispense: 90 Tablet; Refill: 1         Assessment and Plan:  The patient's risk of suicide is assessed as low.  1.  MDD, recurrent, severe, without psychotic features, improving  NICKIE, improving  Insomnia, problems falling asleep  Nicotine dependence, former smoker, now using nicotine pods 3 mg  ETOH use DO, in remission x 4 years  NOTE, SHE HAD [employer]IGHT FROM AN OUTSIDE SOURCE, I WAS ABLE TO OBTAIN A COPY, SENT IN AN 23 EMAIL, THE LINK , CONTACTED THE COMPANY TO RESEND IT AND WILL plan to save copy to her medical record, by [employer] - Several genetic variations that impact her response to medications  Continue self reduced dose of Zoloft  to 50 mg from 100 mg for now, slow metabolizer per [employer]  Increase Pristiq to 50 mg from 25 mg, given 25 mg is not a therapeutic dose, okay to continue with zoloft 50 mg, felt more activated and off (normal metabolizer per [employer])  Continue Hydroxyzine 25 mg 1 to 2 QHS PRN insomnia  Consider Viibryd, check [employer] first  Educated her about Marah Black and also "[employer] - resource for therapist who incorporates spirituality  Continue daily intentional breathing practice - has gotten an audio recording that she is using, as well as Yoga  Continue good sleep hygiene - uses CALM and other behavioral strategies to prepare for bed  Continue in peer recovery for continued remission from alcohol  Educ about caffeine's impact on anxiety and insomnia  Reviewed prior visit HPI, histories and treatment plan in preparation for today's visit    2.  The patient has a safety plan which included the [employer] text and phone line and going to the nearest ED if  symptoms worsen.    3.  Risks, benefits, alternatives and side effects were discussed for all medicines prescribed at this visit.  The patient voiced understanding providing informed consent.  The patient agrees to call the clinic with any questions or concerns, or seek emergent medical care if warranted.    4.  Follow up in 4 weeks or call sooner PRN    The proposed treatment plan was discussed with the patient who was provided the opportunity to ask questions and make suggestions regarding alternative treatment. Patient verbalized understanding and expressed agreement with the plan.     Greater than 16 minutes of the visit was spent in psychotherapy.     Psychotherapy include:  Supportive psychotherapy and psychoeducation, topics: self care, working to nuture her spiritual side, healthy coping strategies, continuing with her sponsor, going to [employer], joined a small group at her [employer], desire to get back in with a therapist who incorporates spirtuality.        Delia Armstrong M.D.      This note was created using voice recognition software (Dragon). The accuracy of the dictation is limited by the abilities of the software. I have reviewed the note prior to signing, however some errors in grammar and context are still possible. If you have any questions related to this note please do not hesitate to contact our office.

## 2024-02-06 ENCOUNTER — OFFICE VISIT (OUTPATIENT)
Dept: MEDICAL GROUP | Facility: MEDICAL CENTER | Age: 44
End: 2024-02-06
Payer: COMMERCIAL

## 2024-02-06 VITALS
OXYGEN SATURATION: 97 % | DIASTOLIC BLOOD PRESSURE: 80 MMHG | BODY MASS INDEX: 23.25 KG/M2 | SYSTOLIC BLOOD PRESSURE: 114 MMHG | TEMPERATURE: 99.6 F | HEIGHT: 69 IN | WEIGHT: 157 LBS | HEART RATE: 69 BPM

## 2024-02-06 DIAGNOSIS — D72.819 LEUKOPENIA, UNSPECIFIED TYPE: ICD-10-CM

## 2024-02-06 DIAGNOSIS — Z12.31 ENCOUNTER FOR SCREENING MAMMOGRAM FOR MALIGNANT NEOPLASM OF BREAST: ICD-10-CM

## 2024-02-06 DIAGNOSIS — R53.83 OTHER FATIGUE: ICD-10-CM

## 2024-02-06 DIAGNOSIS — E78.5 HYPERLIPIDEMIA, UNSPECIFIED HYPERLIPIDEMIA TYPE: ICD-10-CM

## 2024-02-06 DIAGNOSIS — E55.9 HYPOVITAMINOSIS D: ICD-10-CM

## 2024-02-06 DIAGNOSIS — Z23 NEED FOR VACCINATION: ICD-10-CM

## 2024-02-06 DIAGNOSIS — F33.2 SEVERE EPISODE OF RECURRENT MAJOR DEPRESSIVE DISORDER, WITHOUT PSYCHOTIC FEATURES (HCC): ICD-10-CM

## 2024-02-06 PROCEDURE — 3079F DIAST BP 80-89 MM HG: CPT | Performed by: STUDENT IN AN ORGANIZED HEALTH CARE EDUCATION/TRAINING PROGRAM

## 2024-02-06 PROCEDURE — 3074F SYST BP LT 130 MM HG: CPT | Performed by: STUDENT IN AN ORGANIZED HEALTH CARE EDUCATION/TRAINING PROGRAM

## 2024-02-06 PROCEDURE — 99214 OFFICE O/P EST MOD 30 MIN: CPT | Performed by: STUDENT IN AN ORGANIZED HEALTH CARE EDUCATION/TRAINING PROGRAM

## 2024-02-06 ASSESSMENT — ENCOUNTER SYMPTOMS
FEVER: 0
PALPITATIONS: 0
DIAPHORESIS: 0
HEADACHES: 0
DIZZINESS: 0
SHORTNESS OF BREATH: 0
CHILLS: 0
WEIGHT LOSS: 0
WHEEZING: 0
VOMITING: 0
NAUSEA: 0

## 2024-02-06 ASSESSMENT — PATIENT HEALTH QUESTIONNAIRE - PHQ9
5. POOR APPETITE OR OVEREATING: 2 - MORE THAN HALF THE DAYS
SUM OF ALL RESPONSES TO PHQ QUESTIONS 1-9: 13
CLINICAL INTERPRETATION OF PHQ2 SCORE: 4

## 2024-02-06 ASSESSMENT — FIBROSIS 4 INDEX: FIB4 SCORE: 0.6

## 2024-02-06 NOTE — LETTER
[email or web address]  Joseph Cuba M.D.  [address]  [place] [address]  Fax: [phone]   Authorization for Release/Disclosure of   Protected Health Information   Name: OBDULIA SANTOS : 1980 SSN: [id number]   Address: [address] [place] [address] [address]  [place] [address] Phone:    [phone] (home)    I authorize the entity listed below to release/disclose the PHI below to:   [facility]/Joseph Cuba M.D. and Joseph Cuba M.D.   Provider or Entity Name:  Hi Dias [place], [place]   Address   City, State, [facility]   Phone:      Fax:     Reason for request: continuity of care   Information to be released:    [  ] LAST COLONOSCOPY,  including any PATH REPORT and follow-up  [  ] LAST FIT/COLOGUARD RESULT [  ] LAST DEXA  [ x ] LAST MAMMOGRAM  [  ] LAST PAP  [  ] LAST LABS [  ] RETINA EXAM REPORT  [  ] IMMUNIZATION RECORDS  [  ] Release all info  Xxx mammo/ biopsy notes       [  ] Check here and initial the line next to each item to release ALL health information INCLUDING  _____ Care and treatment for drug and / or alcohol abuse  _____ HIV testing, infection status, or AIDS  _____ Genetic Testing    DATES OF SERVICE OR TIME PERIOD TO BE DISCLOSED: _____________  I understand and acknowledge that:  * This Authorization may be revoked at any time by you in writing, except if your health information has already been used or disclosed.  * Your health information that will be used or disclosed as a result of you signing this authorization could be re-disclosed by the recipient. If this occurs, your re-disclosed health information may no longer be protected by State or Federal laws.  * You may refuse to sign this Authorization. Your refusal will not affect your ability to obtain treatment.  * This Authorization becomes effective upon signing and will  on (date) __________.      If no date is indicated, this Authorization will  one (1) year from the signature date.    Name: Obdulia Urbina  Samir  Signature: Date:   2/6/2024     PLEASE FAX REQUESTED RECORDS BACK TO: [phone]

## 2024-02-06 NOTE — PROGRESS NOTES
Subjective:     CC:     HPI:   Obdulia presents today with    PMH anxiety following with psychiatry, hx of ti positive/ leukopenia ( previously seen rheum and heme in [place]), and transaminitis on outside lab.   Last seen 10/2023    Follows with psychiatry- working on different regimen. Rare use of prn hydroxyzine and lorazepam.     Problem   Hyperlipidemia   Hypovitaminosis D   Leukopenia    History of mild leukopenia with low ANC intermittent from [place] 2022.  Was referred to rheumatology and hematology.  -Work-up by primary in [place] noted positive TI patient was referred to rheumatology underwent work-up for urinalysis without proteinuria or hematuria, TI titer 1-40 double-stranded DNA antibody less than 1, negative RF, negative CCP negative Sjogren SCL 70 Norma 1 negative centromere antibody negative ribosomal P antibody negative smooth Mastel antibody screening negative mitochondria antibody screening    Further cancer care specialist Radha  Lab work-up included CBC with differential 8/4/2023 normal WBC count normal ANC, flow cytometry no monoclonal B antibody aberrant T cells or increase in blasts no evidence of non-Hodgkin lymphoproliferative disorder or acute leukemia ESR was normal normal renal function    2/6/24  Cbc with leukopenia, ANC 1500. Denies recurrent infections.   We decided to monitor cbc q 3 month and consider referral to heme if any significant change. Report occasionally sweats at night but this is chronic issue.            Health Maintenance:     ROS:  Review of Systems   Constitutional:  Negative for chills, diaphoresis, fever, malaise/fatigue and weight loss.   HENT:  Negative for hearing loss.    Respiratory:  Negative for shortness of breath and wheezing.    Cardiovascular:  Negative for chest pain and palpitations.   Gastrointestinal:  Negative for nausea and vomiting.   Genitourinary:  Negative for frequency and urgency.   Skin:  Negative for rash.   Neurological:  Negative for  "dizziness and headaches.       Objective:     Exam:  /80 (BP Location: Left arm, Patient Position: Sitting)   Pulse 69   Temp 37.6 °C (99.6 °F) (Temporal)   Ht 1.753 m (5' 9\")   Wt 71.2 kg (157 lb)   SpO2 97%   BMI 23.18 kg/m²  Body mass index is 23.18 kg/m².    Physical Exam  Constitutional:       Appearance: Normal appearance.   Cardiovascular:      Heart sounds: No murmur heard.  Pulmonary:      Effort: Pulmonary effort is normal.      Breath sounds: Normal breath sounds. No wheezing.   Musculoskeletal:      Cervical back: Normal range of motion and neck supple.   Lymphadenopathy:      Cervical: No cervical adenopathy.   Neurological:      Mental Status: She is alert.           Labs:     Assessment & Plan:     43 y.o. female with the following -     1. Leukopenia, unspecified type  Chronic, stable   Standing cbc lab, consider repeat heme referral if any changes  - CBC WITH DIFFERENTIAL; Standing  - VITAMIN D,25 HYDROXY (DEFICIENCY); Future  - VITAMIN B12; Future    2. Hyperlipidemia, unspecified hyperlipidemia type  Chronic, stable  Report history of, controlled with lifestyle  - lipid  - tsh   3. Hypovitaminosis D  Chronic, stable  - VITAMIN D,25 HYDROXY (DEFICIENCY); Future    4. Other fatigue  Chronic, stable   - Lipid Profile; Future  - VITAMIN D,25 HYDROXY (DEFICIENCY); Future  - VITAMIN B12; Future        Return in about 6 months (around 8/6/2024) for Lab review.    Please note that this dictation was created using voice recognition software. I have made every reasonable attempt to correct obvious errors, but I expect that there are errors of grammar and possibly content that I did not discover before finalizing the note.        "

## 2024-02-09 ENCOUNTER — HOSPITAL ENCOUNTER (OUTPATIENT)
Dept: RADIOLOGY | Facility: MEDICAL CENTER | Age: 44
End: 2024-02-09
Payer: COMMERCIAL

## 2024-02-22 ENCOUNTER — HOSPITAL ENCOUNTER (OUTPATIENT)
Dept: RADIOLOGY | Facility: MEDICAL CENTER | Age: 44
End: 2024-02-22
Attending: STUDENT IN AN ORGANIZED HEALTH CARE EDUCATION/TRAINING PROGRAM
Payer: COMMERCIAL

## 2024-02-22 DIAGNOSIS — Z12.31 ENCOUNTER FOR SCREENING MAMMOGRAM FOR MALIGNANT NEOPLASM OF BREAST: ICD-10-CM

## 2024-02-22 PROCEDURE — 77067 SCR MAMMO BI INCL CAD: CPT

## 2024-02-22 PROCEDURE — G0279 TOMOSYNTHESIS, MAMMO: HCPCS

## 2024-03-12 ENCOUNTER — TELEMEDICINE (OUTPATIENT)
Dept: BEHAVIORAL HEALTH | Facility: CLINIC | Age: 44
End: 2024-03-12
Payer: COMMERCIAL

## 2024-03-12 DIAGNOSIS — F41.1 GAD (GENERALIZED ANXIETY DISORDER): ICD-10-CM

## 2024-03-12 DIAGNOSIS — F33.41 RECURRENT MAJOR DEPRESSIVE DISORDER, IN PARTIAL REMISSION (HCC): ICD-10-CM

## 2024-03-12 PROCEDURE — 99214 OFFICE O/P EST MOD 30 MIN: CPT | Mod: 95 | Performed by: PSYCHIATRY & NEUROLOGY

## 2024-03-12 PROCEDURE — 90833 PSYTX W PT W E/M 30 MIN: CPT | Mod: 95 | Performed by: PSYCHIATRY & NEUROLOGY

## 2024-03-12 RX ORDER — HYDROXYZINE HYDROCHLORIDE 25 MG/1
25 TABLET, FILM COATED ORAL 3 TIMES DAILY PRN
Qty: 90 TABLET | Refills: 1 | Status: SHIPPED | OUTPATIENT
Start: 2024-03-12

## 2024-03-12 RX ORDER — CLONIDINE HYDROCHLORIDE 0.1 MG/1
TABLET ORAL
Qty: 180 TABLET | Refills: 1 | Status: SHIPPED | OUTPATIENT
Start: 2024-03-12

## 2024-03-12 NOTE — PROGRESS NOTES
"[facility]  PSYCHIATRIC FOLLOW-UP NOTE    This evaluation was conducted via Zoom, using secure and encrypted videoconferencing technology. The patient was physically located at their home address in [place], [place], and the physician was located at her home office in [place], [place]. The patient was presented by self. The patient’s identity was confirmed and verbal consent for the telemedicine encounter was obtained.    CC:  Presents for follow up visit for medication evaluation and management        History Of Present Illness:  Obdulia Dawson is a 43 y.o. 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     The patient reported the following:  Still feeling anxious, nothing seems to help, wishes she could get to the bottom of what is causing it, seems like it is thoughts that are creating feelings of anxiety. Has a tendency to hold her breath, even in yoga practice.  Tries to be perfect at implementing things, will read all the books and watch all the videos and always feels this pressure of all the things she needs to do to learn something new to help and none of those things pan out.  Has anxious thoughts at bedtime and sometimes takes hydroxyzine 25 mg.  The increase in Pristiq to 50 mg from 25 mg did nothing, still taking Zoloft 50 mg.    Last visit: Her pharmacist warned her about the risk of serotonin syndrome and the symptoms of it sounded like anxiety and so instead of starting the Pristiq at 50 mg with Zoloft staying at 100 mg, she reduced the Zoloft down to 50 mg x 1 week and then started the Pristiq 50 mg.  It made her feel activated and more anxious or \"a little neurotic\".  For example she went to visit family and had to go to her room to calm down.  She is feeling physically sick right now -cold and sore throat.  She contacted this MD on 11 December and agreed " "to try 25 mg of the Pristiq, and this dose combined with the Zoloft 50 mg seems to be better.    ROS: As noted above in HPI.      History 11/20/23 visit: She has been struggling with depression and anxiety her most of her life, strong family hx of both, has been on SSRIs since 2006, most recently Lexapro 20 mg and discontinued it in April to see what her baseline was, and did okay for a little while and then started feeling depressed and anxious and noticing OCD thoughts behaviors.  With her depression and anxiety, it has always been present even with medications but now it is too intense and she feels like her feet are in concrete and she is always having to say to herself \"take it 1 step at a time\" and \"you are fine.\"  She needed Ativan for her anxiety and panic attacks until she began responding to the Zoloft, increased to 100 mg about 3 weeks ago and she is not noticing any significant improvement.  She takes Hydroxyzine 50 mg at times for her insomnia, doesn't want to have to rely on something and so tries to not take it daily. 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  Her  is retired from law enforcement.  She prefers being busy all day.  She has very good sleep hygiene and uses the calm ozzy and works to eat healthy and also exercise.        Past Psychiatric History:  Denies any hospitalizations  Denies any history of SI, SA or self harm  Medication trials:  all SSRIs, has [facility] testing      Family Psychiatric History:  Depression and Anxiety    Substance Use/Addiction History:  Alcohol:  hx of alcohol " use DO, in remission x 4 years with participation in peer support/recovery  Cannabis:  denies  Tobacco:  used to smoke tobacco and then switched to vaping and now uses 3 mg nicotine pods  Caffeine:  makes coffee in AM 1/2 decaf and often has a Coca Cola or coffee in PM but doesn't notice it impacting her sleep, sleeps well once she falls asleep  Other:  Denies any other substances    Social History:  From [place], , one daughter 22 years old, estranged relationship and they have reconnected, her daughter has been living with her ex- since she was 15 years old.  Denies any hx of abuse/trauma.    Allergies:  Patient has no known allergies.      Physical Examination and Mental Status Exam:  Vital signs: There were no vitals taken for this visit.    CONSTITUTIONAL:  General Appearance:  Clean, casual attire, good eye contact, engaged with provider    ORIENTATION:  Oriented to time, place and person  RECENT AND REMOTE MEMORY:  Grossly intact  ATTENTION SPAN AND CONCENTRATION:  within normal range  LANGUAGE:  no deficits appreciated  FUND OF KNOWLEDGE:  has awareness of current events, past history and normal vocabulary  SPEECH:  normal volume, amount, rate and articulation, no perseveration or paucity of language  MOOD:  Anxious  AFFECT:  Mood congruent  THOUGHT PROCESS:  logical and goal directed  THOUGHT CONTENT:  Denies any SI/HI or AVH, no delusional thinking nor preoccupations appreciated  ASSOCIATIONS:  Intact, not loose, no tangentiality or circumstantiality  MEMORY:  No gross evidence of memory deficits  JUDGMENT:  adequate concerning everyday activities  INSIGHT:  adequate to psychiatric condition    DIAGNOSTIC IMPRESSION:  1. NICKIE (generalized anxiety disorder)  - cloNIDine (CATAPRES) 0.1 MG Tab; Take 1 tablet by mouth at bedtime x 7 nights, then take 1 tablet by mouth twice a day  Dispense: 180 Tablet; Refill: 1  - hydrOXYzine HCl (ATARAX) 25 MG Tab; Take 1 Tablet by mouth 3 times a day as needed for  Anxiety.  Dispense: 90 Tablet; Refill: 1    2. Recurrent major depressive disorder, in partial remission (HCC)         Assessment and Plan:  The patient's risk of suicide is assessed as low.  1.  MDD, recurrent, severe, without psychotic features, in partial remission  NICKIE, no change  Insomnia, problems falling asleep - sometimes takes Hydroxyzine  Nicotine dependence, former smoker, now using nicotine pods 3 mg  R/o ADHD  ETOH use DO, in remission x 4 years  Sent [facility] and ARDS via [employer]Light for the patient to complete prior to her next visit  NOTE, SHE HAD GENESIGHT FROM AN OUTSIDE SOURCE, I WAS ABLE TO OBTAIN A COPY, SENT IN AN 11/20/23 EMAIL, and then the patient sent via email, uploaded to Media in EPIC  Continue self reduced dose of Zoloft  to 50 mg from 100 mg for now, slow metabolizer per [employer]  Continue Pristiq 50 mg from 25 mg, given 25 mg is not a therapeutic dose, okay to continue with zoloft 50 mg, felt more activated and off (normal metabolizer per [employer])  Continue Hydroxyzine 25 mg 1 to 2 QHS PRN insomnia  Will not consider propranolol, has genetic markers  Consider buspar - check genesight first  Begin Clonidine 0.1 mg qhs x 7 nights, then BID, NICKIE, educated the patient that this is off label use and that it is in the BP class of medications and to be cautious about being lightheaded or dizzy, especially the first couple of doses, okay to cut tablet in half  Consider Fetzima, as only Fetzima and Prisitq have no genetic markers, slow metabolizer for most others  Prior visit: Educated her about Marah Black and also [employer] - resource for therapist who incorporates spirituality  Continue daily intentional breathing practice - has gotten an audio recording that she is using, as well as Yoga  Continue good sleep hygiene - uses CALM and other behavioral strategies to prepare for bed  Continue in peer recovery for continued remission from alcohol  Educ about caffeine's impact on  "anxiety and insomnia  Reviewed prior visit HPI, histories and treatment plan in preparation for today's visit    2.  The patient has a safety plan which included the 988 crisis text and phone line and going to the nearest ED if symptoms worsen.    3.  Risks, benefits, alternatives and side effects were discussed for all medicines prescribed at this visit.  The patient voiced understanding providing informed consent.  The patient agrees to call the clinic with any questions or concerns, or seek emergent medical care if warranted.    4.  Follow up in 2 weeks, cancellation list, and then 4 weeks or call sooner PRN    The proposed treatment plan was discussed with the patient who was provided the opportunity to ask questions and make suggestions regarding alternative treatment. Patient verbalized understanding and expressed agreement with the plan.     Greater than 16 minutes of the visit was spent in psychotherapy.     Psychotherapy include:  Supportive psychotherapy and psychoeducation, topics: her anxiety, ways she has tried to calm it naturally, disappointment in trying so many \"cures\" that other individuals swear by.  Breathing exercises for anxiety.  Is it anxiety or ADHD, the patient wonders.          Delia Armstrong M.D.      This note was created using voice recognition software (Dragon). The accuracy of the dictation is limited by the abilities of the software. I have reviewed the note prior to signing, however some errors in grammar and context are still possible. If you have any questions related to this note please do not hesitate to contact our office.   "

## 2024-04-15 ENCOUNTER — APPOINTMENT (OUTPATIENT)
Dept: BEHAVIORAL HEALTH | Facility: CLINIC | Age: 44
End: 2024-04-15
Payer: COMMERCIAL

## 2025-02-23 DIAGNOSIS — F33.41 RECURRENT MAJOR DEPRESSIVE DISORDER, IN PARTIAL REMISSION (HCC): ICD-10-CM

## 2025-02-23 DIAGNOSIS — F41.1 GAD (GENERALIZED ANXIETY DISORDER): ICD-10-CM

## 2025-02-25 NOTE — TELEPHONE ENCOUNTER
Left [employer] message for patient to call our office [phone] to schedule an appointment.